# Patient Record
Sex: FEMALE | Race: WHITE | NOT HISPANIC OR LATINO
[De-identification: names, ages, dates, MRNs, and addresses within clinical notes are randomized per-mention and may not be internally consistent; named-entity substitution may affect disease eponyms.]

---

## 2020-09-16 ENCOUNTER — DASHBOARD ENCOUNTERS (OUTPATIENT)
Age: 60
End: 2020-09-16

## 2020-09-16 ENCOUNTER — OFFICE VISIT (OUTPATIENT)
Dept: RURAL CLINIC 4 | Facility: CLINIC | Age: 60
End: 2020-09-16
Payer: COMMERCIAL

## 2020-09-16 DIAGNOSIS — K59.04 CHRONIC IDIOPATHIC CONSTIPATION: ICD-10-CM

## 2020-09-16 DIAGNOSIS — R13.19 CERVICAL DYSPHAGIA: ICD-10-CM

## 2020-09-16 DIAGNOSIS — R19.5 CHANGE IN STOOL CALIBER: ICD-10-CM

## 2020-09-16 DIAGNOSIS — R13.10 ESOPHAGEAL DYSPHAGIA: ICD-10-CM

## 2020-09-16 PROCEDURE — 99204 OFFICE O/P NEW MOD 45 MIN: CPT | Performed by: INTERNAL MEDICINE

## 2020-09-16 PROCEDURE — 99244 OFF/OP CNSLTJ NEW/EST MOD 40: CPT | Performed by: INTERNAL MEDICINE

## 2020-09-16 RX ORDER — LOTEPREDNOL ETABONATE 5 MG/ML
1 DROP INTO AFFECTED EYE SUSPENSION/ DROPS OPHTHALMIC
Status: ACTIVE | COMMUNITY

## 2020-09-16 RX ORDER — MONTELUKAST SODIUM 10 MG/1
1 TABLET TABLET ORAL ONCE A DAY
Status: ACTIVE | COMMUNITY

## 2020-09-16 RX ORDER — ACYCLOVIR 800 MG/1
1 TABLET TABLET ORAL TWICE A DAY
Status: ACTIVE | COMMUNITY

## 2020-09-16 RX ORDER — FLUTICASONE PROPIONATE 50 UG/1
1 SPRAY IN EACH NOSTRIL SPRAY, METERED NASAL ONCE A DAY
Status: ACTIVE | COMMUNITY

## 2020-09-16 RX ORDER — DOCUSATE SODIUM 100 MG/1
1 CAPSULE AS NEEDED CAPSULE, LIQUID FILLED ORAL ONCE A DAY
Status: ACTIVE | COMMUNITY

## 2020-09-16 RX ORDER — BUDESONIDE AND FORMOTEROL FUMARATE DIHYDRATE 160; 4.5 UG/1; UG/1
2 PUFFS AEROSOL RESPIRATORY (INHALATION) TWICE A DAY
Status: ACTIVE | COMMUNITY

## 2020-09-16 NOTE — HPI-TODAY'S VISIT:
Pt referred by Dr. Rebekah Rodriguez for constipation. A copy of today's note will be sent to referring provider. Constpation started a few weeks ago. Severe and thin calibre stools. Mild abd discomfort in LLQ. CT 7/2020 with descending colitis and diverticulosis. Given cipro/flagyl with only mild improvement. Colonoscopy 15 yrs ago normal.   Also with intermittent solid dysphagia. Non progressive. Hx of esophageal stricture s/p dilation with EGD 15 yrs ago. No GERD.

## 2020-09-17 PROBLEM — 82934008: Status: ACTIVE | Noted: 2020-09-17

## 2020-10-21 ENCOUNTER — OFFICE VISIT (OUTPATIENT)
Dept: RURAL MEDICAL CENTER 2 | Facility: MEDICAL CENTER | Age: 60
End: 2020-10-21

## 2020-10-22 ENCOUNTER — OFFICE VISIT (OUTPATIENT)
Dept: RURAL MEDICAL CENTER 2 | Facility: MEDICAL CENTER | Age: 60
End: 2020-10-22
Payer: COMMERCIAL

## 2020-10-22 DIAGNOSIS — R13.19 CERVICAL DYSPHAGIA: ICD-10-CM

## 2020-10-22 DIAGNOSIS — K29.60 ADENOPAPILLOMATOSIS GASTRICA: ICD-10-CM

## 2020-10-22 DIAGNOSIS — R19.4 ALTERED BOWEL HABITS: ICD-10-CM

## 2020-10-22 DIAGNOSIS — K20.80 LOS ANGELES GRADE B ESOPHAGITIS: ICD-10-CM

## 2020-10-22 PROCEDURE — 43248 EGD GUIDE WIRE INSERTION: CPT | Performed by: INTERNAL MEDICINE

## 2020-10-22 PROCEDURE — 43239 EGD BIOPSY SINGLE/MULTIPLE: CPT | Performed by: INTERNAL MEDICINE

## 2020-10-22 PROCEDURE — G9937 DIG OR SURV COLSCO: HCPCS | Performed by: INTERNAL MEDICINE

## 2020-10-22 PROCEDURE — 45378 DIAGNOSTIC COLONOSCOPY: CPT | Performed by: INTERNAL MEDICINE
